# Patient Record
(demographics unavailable — no encounter records)

---

## 2025-04-18 NOTE — DATA REVIEWED
[FreeTextEntry1] : 1. Referral documents reviewed  2. history obtained from primary caregiver as independent historian due to patient's developmental age and limited recall  3. I reviewed and interpreted the sleep study results and reviewed them with the patient and family My interpretation is in keeping with  Sleep apnea present: yes Type: Predominantly obstructive  Degree: severe with AHI of 37.8 and SaO2 hair of 89%

## 2025-04-18 NOTE — HISTORY OF PRESENT ILLNESS
[de-identified] : 6 year old female presents for initial evaluation of snores Snores heavily and wakes up multiple times a night Mom does hear apneas and coughing PSG 3/27/25 Frequent nasal congestion and rhinorrhea  Constant mouth breathing Started on flonase 6 months ago but use has been inconsistent In the beginning she was using it consistently and it did not make a difference No issues swallowing, occasional choking but mom feels this is because she goes to fast When awake no issues breathing  No recent fevers or infections  PMH: severe BLAISE, obesity Rx: none ALL: NKDA Social history: one sibling (20 yo), one pending (mother 5 months pregnant), lives with mother  Surgical history: none birth history noncontributory   ROS negative otherwise

## 2025-04-18 NOTE — HISTORY OF PRESENT ILLNESS
[de-identified] : 6 year old female presents for initial evaluation of snores Snores heavily and wakes up multiple times a night Mom does hear apneas and coughing PSG 3/27/25 Frequent nasal congestion and rhinorrhea  Constant mouth breathing Started on flonase 6 months ago but use has been inconsistent In the beginning she was using it consistently and it did not make a difference No issues swallowing, occasional choking but mom feels this is because she goes to fast When awake no issues breathing  No recent fevers or infections  PMH: severe BLAISE, obesity Rx: none ALL: NKDA Social history: one sibling (20 yo), one pending (mother 5 months pregnant), lives with mother  Surgical history: none birth history noncontributory   ROS negative otherwise